# Patient Record
Sex: FEMALE | Race: BLACK OR AFRICAN AMERICAN | NOT HISPANIC OR LATINO | Employment: OTHER | URBAN - METROPOLITAN AREA
[De-identification: names, ages, dates, MRNs, and addresses within clinical notes are randomized per-mention and may not be internally consistent; named-entity substitution may affect disease eponyms.]

---

## 2019-07-29 PROBLEM — J38.1 VOCAL FOLD POLYP: Status: RESOLVED | Noted: 2019-07-29 | Resolved: 2019-07-29

## 2019-07-29 PROBLEM — J38.1 VOCAL FOLD POLYP: Status: ACTIVE | Noted: 2019-07-29

## 2019-09-12 PROBLEM — H50.111 EXOTROPIA OF RIGHT EYE: Status: ACTIVE | Noted: 2019-09-12

## 2019-09-12 PROBLEM — H26.9 EARLY CATARACT: Status: ACTIVE | Noted: 2019-09-12

## 2019-09-12 PROBLEM — H57.13 OCULAR PAIN, BILATERAL: Status: ACTIVE | Noted: 2019-09-12

## 2019-12-11 PROBLEM — H01.00B BLEPHARITIS OF UPPER AND LOWER EYELIDS OF BOTH EYES: Status: ACTIVE | Noted: 2019-12-11

## 2019-12-11 PROBLEM — H25.813 COMBINED FORMS OF AGE-RELATED CATARACT OF BOTH EYES: Status: ACTIVE | Noted: 2019-12-11

## 2019-12-11 PROBLEM — H01.00A BLEPHARITIS OF UPPER AND LOWER EYELIDS OF BOTH EYES: Status: ACTIVE | Noted: 2019-12-11

## 2019-12-11 PROBLEM — H50.9 STRABISMUS: Status: ACTIVE | Noted: 2019-12-11

## 2020-02-11 PROBLEM — H50.15 ALTERNATING EXOTROPIA: Status: ACTIVE | Noted: 2020-02-11

## 2020-02-11 PROBLEM — H50.21 HYPERTROPIA OF RIGHT EYE: Status: ACTIVE | Noted: 2020-02-11

## 2020-05-12 PROBLEM — Z01.818 PRE-OP EXAM: Status: ACTIVE | Noted: 2020-05-12

## 2020-05-25 ENCOUNTER — NURSE TRIAGE (OUTPATIENT)
Dept: ADMINISTRATIVE | Facility: CLINIC | Age: 50
End: 2020-05-25

## 2020-05-26 NOTE — TELEPHONE ENCOUNTER
[facility]. Pt denies any symptoms of cough, fever, or difficulty breathing since her procedure. Information only protocol is in place. No further calls required.

## 2020-05-26 NOTE — TELEPHONE ENCOUNTER
Additional Information   Negative: Nursing judgment   Negative: Nursing judgment   Negative: Nursing judgment   Negative: Nursing judgment   Negative: Information only question and nurse able to answer    Protocols used: NO PROTOCOL AVAILABLE - INFORMATION ONLY-A-[place]

## 2021-02-26 PROBLEM — K21.9 GASTROESOPHAGEAL REFLUX DISEASE WITHOUT ESOPHAGITIS: Status: ACTIVE | Noted: 2021-02-26

## 2021-02-26 PROBLEM — G47.09 OTHER INSOMNIA: Status: ACTIVE | Noted: 2021-02-26

## 2021-02-26 PROBLEM — Z01.818 PRE-OP EXAM: Status: RESOLVED | Noted: 2020-05-12 | Resolved: 2021-02-26

## 2021-02-26 PROBLEM — H57.13 OCULAR PAIN, BILATERAL: Status: RESOLVED | Noted: 2019-09-12 | Resolved: 2021-02-26

## 2021-02-26 PROBLEM — H50.9 STRABISMUS: Status: RESOLVED | Noted: 2019-12-11 | Resolved: 2021-02-26

## 2021-02-26 PROBLEM — K58.8 OTHER IRRITABLE BOWEL SYNDROME: Status: ACTIVE | Noted: 2021-02-26

## 2021-02-26 PROBLEM — H50.111 EXOTROPIA OF RIGHT EYE: Status: RESOLVED | Noted: 2019-09-12 | Resolved: 2021-02-26

## 2021-02-26 PROBLEM — H50.21 HYPERTROPIA OF RIGHT EYE: Status: RESOLVED | Noted: 2020-02-11 | Resolved: 2021-02-26

## 2021-02-26 PROBLEM — J01.00 ACUTE NON-RECURRENT MAXILLARY SINUSITIS: Status: ACTIVE | Noted: 2021-02-26

## 2021-02-26 PROBLEM — N32.81 OVERACTIVE BLADDER: Status: ACTIVE | Noted: 2021-02-26

## 2021-05-31 PROBLEM — J01.00 ACUTE NON-RECURRENT MAXILLARY SINUSITIS: Status: RESOLVED | Noted: 2021-02-26 | Resolved: 2021-05-31

## 2021-08-04 PROBLEM — Z80.3 FAMILY HISTORY OF BREAST CANCER IN SISTER: Status: ACTIVE | Noted: 2017-03-30

## 2021-08-04 PROBLEM — K57.30 DIVERTICULOSIS LARGE INTESTINE W/O PERFORATION OR ABSCESS W/O BLEEDING: Status: ACTIVE | Noted: 2021-08-04

## 2021-08-04 PROBLEM — H01.00A BLEPHARITIS OF UPPER AND LOWER EYELIDS OF BOTH EYES: Status: RESOLVED | Noted: 2019-12-11 | Resolved: 2021-08-04

## 2021-08-04 PROBLEM — H50.15 ALTERNATING EXOTROPIA: Status: RESOLVED | Noted: 2020-02-11 | Resolved: 2021-08-04

## 2021-08-04 PROBLEM — R19.7 DIARRHEA: Status: ACTIVE | Noted: 2021-08-04

## 2021-08-04 PROBLEM — H01.00B BLEPHARITIS OF UPPER AND LOWER EYELIDS OF BOTH EYES: Status: RESOLVED | Noted: 2019-12-11 | Resolved: 2021-08-04

## 2021-08-04 PROBLEM — Z90.710 S/P ABDOMINAL HYSTERECTOMY: Status: RESOLVED | Noted: 2017-05-19 | Resolved: 2021-08-04

## 2021-08-04 PROBLEM — F32.9 MAJOR DEPRESSIVE DISORDER: Status: ACTIVE | Noted: 2020-06-03

## 2021-08-04 PROBLEM — H26.9 EARLY CATARACT: Status: RESOLVED | Noted: 2019-09-12 | Resolved: 2021-08-04

## 2021-08-04 PROBLEM — F12.10 MARIJUANA ABUSE, CONTINUOUS: Status: ACTIVE | Noted: 2021-08-04

## 2021-08-04 PROBLEM — Z90.710 S/P ABDOMINAL HYSTERECTOMY: Status: ACTIVE | Noted: 2017-05-19

## 2021-08-04 PROBLEM — J44.9 CHRONIC OBSTRUCTIVE LUNG DISEASE: Status: ACTIVE | Noted: 2019-01-17

## 2024-11-22 ENCOUNTER — PATIENT MESSAGE (OUTPATIENT)
Dept: RESEARCH | Facility: HOSPITAL | Age: 54
End: 2024-11-22